# Patient Record
Sex: MALE | Race: BLACK OR AFRICAN AMERICAN | NOT HISPANIC OR LATINO | Employment: STUDENT | URBAN - METROPOLITAN AREA
[De-identification: names, ages, dates, MRNs, and addresses within clinical notes are randomized per-mention and may not be internally consistent; named-entity substitution may affect disease eponyms.]

---
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
## 2025-02-14 ENCOUNTER — OFFICE VISIT (OUTPATIENT)
Dept: URGENT CARE | Facility: CLINIC | Age: 20
End: 2025-02-14
Payer: COMMERCIAL

## 2025-02-14 VITALS
RESPIRATION RATE: 18 BRPM | HEART RATE: 93 BPM | TEMPERATURE: 98 F | DIASTOLIC BLOOD PRESSURE: 78 MMHG | WEIGHT: 138 LBS | OXYGEN SATURATION: 98 % | SYSTOLIC BLOOD PRESSURE: 120 MMHG

## 2025-02-14 DIAGNOSIS — J01.01 ACUTE RECURRENT MAXILLARY SINUSITIS: Primary | ICD-10-CM

## 2025-02-14 PROBLEM — M47.899 HLA-B27 SPONDYLOARTHROPATHY: Status: ACTIVE | Noted: 2020-07-21

## 2025-02-14 PROBLEM — K59.01 SLOW TRANSIT CONSTIPATION: Status: ACTIVE | Noted: 2018-06-28

## 2025-02-14 PROCEDURE — G0382 LEV 3 HOSP TYPE B ED VISIT: HCPCS | Performed by: NURSE PRACTITIONER

## 2025-02-14 PROCEDURE — S9083 URGENT CARE CENTER GLOBAL: HCPCS | Performed by: NURSE PRACTITIONER

## 2025-02-14 RX ORDER — PREDNISONE 20 MG/1
40 TABLET ORAL DAILY
Qty: 10 TABLET | Refills: 0 | Status: SHIPPED | OUTPATIENT
Start: 2025-02-14 | End: 2025-02-19

## 2025-02-14 RX ORDER — ADALIMUMAB 40MG/0.4ML
40 KIT SUBCUTANEOUS
COMMUNITY
Start: 2024-12-20

## 2025-02-14 RX ORDER — AMOXICILLIN 500 MG/1
500 CAPSULE ORAL EVERY 12 HOURS SCHEDULED
Qty: 14 CAPSULE | Refills: 0 | Status: SHIPPED | OUTPATIENT
Start: 2025-02-14 | End: 2025-02-21

## 2025-02-14 RX ORDER — NAPROXEN 500 MG/1
500 TABLET ORAL 2 TIMES DAILY
COMMUNITY
Start: 2024-12-03

## 2025-02-14 RX ORDER — TOFACITINIB 11 MG/1
11 TABLET, FILM COATED, EXTENDED RELEASE ORAL DAILY
COMMUNITY
Start: 2024-11-04

## 2025-02-14 NOTE — PROGRESS NOTES
[facility]        NAME: Basim Herrera is a 19 y.o. male  : 2005    MRN: [id number]  DATE: 2025  TIME: 10:29 AM    Assessment and Plan   Acute recurrent maxillary sinusitis [J01.01]  1. Acute recurrent maxillary sinusitis  predniSONE 20 mg tablet    amoxicillin (AMOXIL) 500 mg capsule        Advised to start prednisone and amoxicillin as directed, take with food. Can continue daily antihistamine, use caution with the decongestants if using more than 5-7 days. Can use saline sinus rinse daily and salt water gargles.     Patient Instructions       Follow up with PCP in 3-5 days.  Proceed to  ER if symptoms worsen.    If tests are performed, our office will contact you with results only if changes need to made to the care plan discussed with you at the visit. You can review your full results on [facility].    Chief Complaint     Chief Complaint   Patient presents with    Facial Pain     For 3 days sinus pain and pressure, whole face is sore.  PND.          History of Present Illness       Sinusitis  This is a new problem. The current episode started in the past 7 days (3 days). The problem is unchanged. There has been no fever. Associated symptoms include congestion, sinus pressure and a sore throat. Pertinent negatives include no chills, coughing, diaphoresis, ear pain, headaches, hoarse voice, neck pain, shortness of breath, sneezing or swollen glands. Past treatments include oral decongestants. The treatment provided mild relief.       Review of Systems   Review of Systems   Constitutional:  Negative for chills and diaphoresis.   HENT:  Positive for congestion, sinus pressure, sinus pain and sore throat. Negative for ear pain, hoarse voice and sneezing.    Respiratory:  Negative for cough and shortness of breath.    Musculoskeletal:  Negative for neck pain.   Neurological:  Negative for headaches.         Current Medications       Current Outpatient Medications:      Adalimumab (Humira, 2 Pen,) 40 MG/0.4ML AJKT, Inject 40 mg under the skin, Disp: , Rfl:     amoxicillin (AMOXIL) 500 mg capsule, Take 1 capsule (500 mg total) by mouth every 12 (twelve) hours for 7 days, Disp: 14 capsule, Rfl: 0    naproxen (NAPROSYN) 500 mg tablet, Take 500 mg by mouth 2 (two) times a day, Disp: , Rfl:     polyethylene glycol (MIRALAX) 17 g packet, Take 17 g by mouth daily, Disp: 14 each, Rfl: 0    predniSONE 20 mg tablet, Take 2 tablets (40 mg total) by mouth daily for 5 days, Disp: 10 tablet, Rfl: 0    Tofacitinib Citrate ER (Xeljanz XR) 11 MG TB24, Take 11 mg by mouth daily, Disp: , Rfl:     Current Allergies     Allergies as of 02/14/2025 - Reviewed 02/14/2025   Allergen Reaction Noted    Pollen extract Allergic Rhinitis 10/04/2018            The following portions of the patient's history were reviewed and updated as appropriate: allergies, current medications, past family history, past medical history, past social history, past surgical history and problem list.     Past Medical History:   Diagnosis Date    Juvenile arthritis (HCC)        No past surgical history on file.    No family history on file.      Medications have been verified.        Objective   /78   Pulse 93   Temp 98 °F (36.7 °C)   Resp 18   Wt 62.6 kg (138 lb)   SpO2 98%        Physical Exam     Physical Exam  Vitals and nursing note reviewed.   Constitutional:       General: He is not in acute distress.     Appearance: Normal appearance. He is not ill-appearing.   HENT:      Head: Normocephalic and atraumatic.      Right Ear: Hearing, tympanic membrane, ear canal and external ear normal.      Left Ear: Hearing, tympanic membrane, ear canal and external ear normal.      Nose: Mucosal edema, congestion and rhinorrhea present.      Right Turbinates: Enlarged.      Left Turbinates: Enlarged.      Right Sinus: Maxillary sinus tenderness present. No frontal sinus tenderness.      Left Sinus: Maxillary sinus tenderness  present. No frontal sinus tenderness.      Mouth/Throat:      Mouth: Mucous membranes are moist.      Pharynx: Posterior oropharyngeal erythema present. No oropharyngeal exudate.   Eyes:      General: Allergic shiner present.      Pupils: Pupils are equal, round, and reactive to light.   Cardiovascular:      Rate and Rhythm: Normal rate and regular rhythm.      Pulses: Normal pulses.      Heart sounds: Normal heart sounds.   Pulmonary:      Effort: Pulmonary effort is normal.      Breath sounds: Normal breath sounds.   Musculoskeletal:      Cervical back: Normal range of motion and neck supple.   Skin:     General: Skin is warm and dry.   Neurological:      Mental Status: He is alert.

## 2025-02-14 NOTE — LETTER
February 14, 2025     Patient: Basim Herrera   YOB: 2005   Date of Visit: 2/14/2025       To Whom it May Concern:    Basim Herrera was seen in my clinic on 2/14/2025. He may return to work on 02/16/2025 .    If you have any questions or concerns, please don't hesitate to call.         Sincerely,          EDSON Cueto        CC: No Recipients